# Patient Record
Sex: MALE | Race: WHITE | NOT HISPANIC OR LATINO | Employment: FULL TIME | URBAN - METROPOLITAN AREA
[De-identification: names, ages, dates, MRNs, and addresses within clinical notes are randomized per-mention and may not be internally consistent; named-entity substitution may affect disease eponyms.]

---

## 2023-02-28 ENCOUNTER — HOSPITAL ENCOUNTER (EMERGENCY)
Facility: CLINIC | Age: 43
Discharge: HOME | End: 2023-02-28
Attending: EMERGENCY MEDICINE | Admitting: EMERGENCY MEDICINE
Payer: OTHER GOVERNMENT

## 2023-02-28 VITALS
HEIGHT: 71 IN | DIASTOLIC BLOOD PRESSURE: 78 MMHG | WEIGHT: 281 LBS | OXYGEN SATURATION: 97 % | SYSTOLIC BLOOD PRESSURE: 149 MMHG | HEART RATE: 86 BPM | BODY MASS INDEX: 39.34 KG/M2 | RESPIRATION RATE: 18 BRPM | TEMPERATURE: 97.2 F

## 2023-02-28 DIAGNOSIS — S86.812A STRAIN OF LEFT CALF MUSCLE: ICD-10-CM

## 2023-02-28 PROCEDURE — 99283 EMERGENCY DEPT VISIT LOW MDM: CPT

## 2023-02-28 RX ORDER — IBUPROFEN 800 MG/1
800 TABLET, FILM COATED ORAL EVERY 8 HOURS PRN
Qty: 30 TABLET | Refills: 0 | Status: SHIPPED | OUTPATIENT
Start: 2023-02-28 | End: 2023-03-08

## 2023-02-28 RX ORDER — HYDROCODONE BITARTRATE AND ACETAMINOPHEN 5; 325 MG/1; MG/1
1 TABLET ORAL EVERY 6 HOURS PRN
Qty: 10 TABLET | Refills: 0 | Status: SHIPPED | OUTPATIENT
Start: 2023-02-28 | End: 2023-03-03

## 2023-02-28 NOTE — ED TRIAGE NOTES
"Patient was playing kickball this morning, when he began to run he felt a pop to posterior calf described as when you \"cut a tight ribbon\". Able to extend foot, extension is very painful. No tylenol or motrin this morning. Took prednisone.      Triage Assessment     Row Name 02/28/23 1113       Triage Assessment (Adult)    Airway WDL WDL       Respiratory WDL    Respiratory WDL WDL       Skin Circulation/Temperature WDL    Skin Circulation/Temperature WDL X  diarphoretic       Cardiac WDL    Cardiac WDL WDL       Peripheral/Neurovascular WDL    Peripheral Neurovascular WDL WDL       Cognitive/Neuro/Behavioral WDL    Cognitive/Neuro/Behavioral WDL WDL              "

## 2023-02-28 NOTE — DISCHARGE INSTRUCTIONS
Expect increased swelling and bruising as discussed.  Get compressive stocking as discussed.  Expect gradual improvement over 3 to 4 weeks

## 2023-02-28 NOTE — ED PROVIDER NOTES
"EMERGENCY DEPARTMENT ENCOUNTER      NAME: Adrian Hartman II  AGE: 42 year old male  YOB: 1980  MRN: [id number]  EVALUATION DATE & TIME: 2/28/2023 11:22 AM    PCP: No primary care provider on file.    ED PROVIDER: Wesley Brumfield M.D.      Chief Complaint   Patient presents with     Leg Pain         FINAL IMPRESSION:  1. Strain of left calf muscle          ED COURSE & MEDICAL DECISION MAKING:    Pertinent Labs & Imaging studies reviewed. (See chart for details)  42 year old male presents to the Emergency Department for evaluation of sudden sharp discomfort left calf.  Patient had onset while running this morning playing kickball.  Patient felt \"something snap\".  Localized in the proximal calf and posterior knee area.  Arrives on crutches.  On exam he is an obese male mild distress.  Calf with diffuse proximal tenderness.  Marked tenderness in the posterior knee area.  No ecchymosis.  Achilles intact and nontender.  Knee without effusion, tenderness, instability.  Patient likely with acute muscle strain/tear.  Possible popliteus rupture.  Patient cautioned about increased swelling and ecchymosis.  Need to wear compressive stockings.  Slow recovery over 4 to 5 weeks.  Crutch walk as tolerated.  Discharge with ibuprofen and hydrocodone for discomfort.. Patient appears non toxic with stable vitals signs. Overall exam is benign.        12:19 AM I met with the patient for the initial interview and physical examination. Discussed plan for treatment and workup in the ED.      At the conclusion of the encounter I discussed the results of all of the tests and the disposition. The questions were answered and return precautions provided. The patient or family acknowledged understanding and was agreeable with the care plan.       PPE: Provider wore gloves, N95 mask, eye protection.     MEDICATIONS GIVEN IN THE EMERGENCY:  Medications - No data to display    NEW PRESCRIPTIONS STARTED AT TODAY'S ER VISIT  New " "Prescriptions    HYDROCODONE-ACETAMINOPHEN (NORCO) 5-325 MG TABLET    Take 1 tablet by mouth every 6 hours as needed for severe pain (7-10)    IBUPROFEN (ADVIL/MOTRIN) 800 MG TABLET    Take 1 tablet (800 mg) by mouth every 8 hours as needed for moderate pain (4-6)          =================================================================    HPI        Adrian Hartman II is a 42 year old male with a pertient medical history of obesity who presents with sudden left calf pain.  Onset just prior to arrival playing kickball.  Reports he felt something \"tear\".  Localizes discomfort in the proximal calf area.  Worsens with ambulation.  Feeling well prior to episode.    REVIEW OF SYSTEMS   Constitutional:  Denies fever, chills  Respiratory:  Denies productive cough or increased work of breathing  Cardiovascular:  Denies chest pain, palpitations  GI:  Denies abdominal pain, nausea, vomiting, or change in bowel or bladder habits   Musculoskeletal:  Denies any new muscle/joint swelling  Skin:  Denies rash   Neurologic:  Denies focal weakness  All systems negative except as marked.     PAST MEDICAL HISTORY:  History reviewed. No pertinent past medical history.    PAST SURGICAL HISTORY:  History reviewed. No pertinent surgical history.      CURRENT MEDICATIONS:    No current facility-administered medications for this encounter.    Current Outpatient Medications:      HYDROcodone-acetaminophen (NORCO) 5-325 MG tablet, Take 1 tablet by mouth every 6 hours as needed for severe pain (7-10), Disp: 10 tablet, Rfl: 0     ibuprofen (ADVIL/MOTRIN) 800 MG tablet, Take 1 tablet (800 mg) by mouth every 8 hours as needed for moderate pain (4-6), Disp: 30 tablet, Rfl: 0    ALLERGIES:  No Known Allergies    FAMILY HISTORY:  History reviewed. No pertinent family history.    SOCIAL HISTORY:        VITALS:  Patient Vitals for the past 24 hrs:   BP Temp Temp src Pulse Resp SpO2 Height Weight   02/28/23 1112 (!) 149/78 97.2  F (36.2  C) Temporal 90 " "18 97 % 1.803 m (5' 11\") 127.5 kg (281 lb)        PHYSICAL EXAM    Constitutional:  Awake, alert, in mild apparent distress  HENT:  Normocephalic, Atraumatic. Bilateral external ears normal. Oropharynx moist. Nose normal. Neck- Normal range of motion with no guarding,  Eyes:  PERRL, EOMI with no signs of entrapment, Conjunctiva normal, No discharge.   Musculoskeletal:  Intact distal pulses, No edema. Good range of motion in all major joints. No tenderness to palpation or major deformities noted.  Integument:  Warm, Dry, No erythema, No rash.   Neurologic:  Alert & oriented, Normal motor function, Normal sensory function, No focal deficits noted.   Psychiatric:  Affect normal, Judgment normal, Mood normal.            Wesley Brumfield M.D.  Emergency Medicine  [facility] EMERGENCY ROOM       Wesley Brumfield MD  02/28/23 1225    "